# Patient Record
Sex: FEMALE
[De-identification: names, ages, dates, MRNs, and addresses within clinical notes are randomized per-mention and may not be internally consistent; named-entity substitution may affect disease eponyms.]

---

## 2024-10-07 ENCOUNTER — DASHBOARD ENCOUNTERS (OUTPATIENT)
Age: 34
End: 2024-10-07

## 2024-10-07 ENCOUNTER — OFFICE VISIT (OUTPATIENT)
Dept: URBAN - METROPOLITAN AREA CLINIC 82 | Facility: CLINIC | Age: 34
End: 2024-10-07
Payer: COMMERCIAL

## 2024-10-07 ENCOUNTER — LAB OUTSIDE AN ENCOUNTER (OUTPATIENT)
Dept: URBAN - METROPOLITAN AREA CLINIC 82 | Facility: CLINIC | Age: 34
End: 2024-10-07

## 2024-10-07 VITALS
DIASTOLIC BLOOD PRESSURE: 75 MMHG | HEART RATE: 69 BPM | WEIGHT: 164 LBS | BODY MASS INDEX: 32.2 KG/M2 | HEIGHT: 60 IN | SYSTOLIC BLOOD PRESSURE: 110 MMHG | TEMPERATURE: 98.3 F

## 2024-10-07 DIAGNOSIS — R10.13 EPIGASTRIC PAIN: ICD-10-CM

## 2024-10-07 DIAGNOSIS — K82.4 GALLBLADDER POLYP: ICD-10-CM

## 2024-10-07 DIAGNOSIS — R93.89 ABNORMAL CT SCAN: ICD-10-CM

## 2024-10-07 PROBLEM — 129679001: Status: ACTIVE | Noted: 2024-10-07

## 2024-10-07 PROBLEM — 79922009: Status: ACTIVE | Noted: 2024-10-07

## 2024-10-07 PROBLEM — 197433003: Status: ACTIVE | Noted: 2024-10-07

## 2024-10-07 PROCEDURE — 99203 OFFICE O/P NEW LOW 30 MIN: CPT | Performed by: STUDENT IN AN ORGANIZED HEALTH CARE EDUCATION/TRAINING PROGRAM

## 2024-10-07 NOTE — PHYSICAL EXAM MUSCULOSKELETAL:
ROMs unremarkable in UEs/LEs,  , normal gait and station
Normal vision: sees adequately in most situations; can see medication labels, newsprint

## 2024-10-07 NOTE — HPI-TODAY'S VISIT:
33 y/o female presents today for c/o abnormal imagings. Patient was dealing w/ epigastric pain, nausea and dizzy x 2 wks. Patient reports abd pain w/ meals but can be presents w/out meals. Radiates downwards to the lower abd region. Does like to consume acidic meals but recently avoid it. Minimal relief w prilosec, better relief w/ dicyclomine ETOh socially. No smoking. No FHx of CRC cancer, gastric cancer, celiac disease.  CT scan ordered by PCP on 9/25/24 noted non specific diffuse wall thickening of GB. H pylori neg w PCP. Was sent to ER. Went to ER 4 days later, had a RUQ US that noted no gallstones or wall thickening. There is a 6mm gallbladder polyp. CBC/CMP wnl.

## 2024-10-07 NOTE — PHYSICAL EXAM CONSTITUTIONAL:
NAD, alert and oriented, well developed, well nourished, ambulating without difficulty, sitting comfortably in the chair,  by side

## 2024-10-18 ENCOUNTER — WEB ENCOUNTER (OUTPATIENT)
Dept: URBAN - METROPOLITAN AREA CLINIC 82 | Facility: CLINIC | Age: 34
End: 2024-10-18

## 2025-01-27 ENCOUNTER — OFFICE VISIT (OUTPATIENT)
Dept: URBAN - METROPOLITAN AREA CLINIC 82 | Facility: CLINIC | Age: 35
End: 2025-01-27
Payer: COMMERCIAL

## 2025-01-27 VITALS
WEIGHT: 162 LBS | HEIGHT: 60 IN | DIASTOLIC BLOOD PRESSURE: 82 MMHG | BODY MASS INDEX: 31.8 KG/M2 | TEMPERATURE: 98 F | HEART RATE: 71 BPM | SYSTOLIC BLOOD PRESSURE: 121 MMHG

## 2025-01-27 DIAGNOSIS — R79.89 ABNORMAL LFTS: ICD-10-CM

## 2025-01-27 PROCEDURE — 99214 OFFICE O/P EST MOD 30 MIN: CPT | Performed by: INTERNAL MEDICINE

## 2025-01-27 RX ORDER — RIZATRIPTAN BENZOATE 5 MG/1
1 TABLET TABLET ORAL ONCE A DAY
Status: ACTIVE | COMMUNITY
Start: 2025-01-27

## 2025-01-27 NOTE — HPI-TODAY'S VISIT:
34-year-old female patient was seen today for abnormal liver enzymes.  Patient underwent recent lap cholecystectomy for colon "cholelithiasis.  She was noted to have AST and ALT slightly elevated 31 439.  Patient admits to using ibuprofen off and on for possible surgery and presurgery 2.  She also has migraine headaches and is on rizatriptan.  Patient was noted to have a 5 Score of 0.76.  She denies diabetes not on any oral contraceptives denies any family history of liver disease.

## 2025-01-27 NOTE — HPI-TODAY'S VISIT:
33 y/o female presents today for c/o abnormal imagings. Patient was dealing w/ epigastric pain, nausea and dizzy x 2 wks. Patient reports abd pain w/ meals but can be presents w/out meals. Radiates downwards to the lower abd region. Does like to consume acidic meals but recently avoid it. Minimal relief w prilosec, better relief w/ dicyclomine ETOh socially. No smoking. No FHx of CRC cancer, gastric cancer, celiac disease.  CT scan ordered by PCP on 9/25/24 noted non specific diffuse wall thickening of GB. H pylori neg w PCP. Was sent to ER. Went to ER 4 days later, had a RUQ US that noted no gallstones or wall thickening. There is a 6mm gallbladder polyp. CBC/CMP wnl.  1/27/25:

## 2025-01-30 ENCOUNTER — TELEPHONE ENCOUNTER (OUTPATIENT)
Dept: URBAN - METROPOLITAN AREA CLINIC 82 | Facility: CLINIC | Age: 35
End: 2025-01-30

## 2025-01-30 LAB
A/G RATIO: 0.9
ALBUMIN: 3.8
ALKALINE PHOSPHATASE: 56
ALT (SGPT): 34
ALT: 34
ANA PATTERN: (no result)
ANA SCREEN, IFA: POSITIVE
ANA TITER: (no result)
AST (SGOT): 37
AST: 37
BILIRUBIN, TOTAL: 0.4
BUN/CREATININE RATIO: (no result)
BUN: 12
CALCIUM: 8.9
CARBON DIOXIDE, TOTAL: 24
CHLORIDE: 109
CREATININE: 0.6
EGFR: 122
FIB 4 INDEX: 0.95
GLOBULIN, TOTAL: 4.1
GLUCOSE: 96
HEP A AB, IGM: (no result)
HEPATITIS A AB, TOTAL: REACTIVE
HEPATITIS B SURFACE ANTIBODY QL: REACTIVE
HEPATITIS C ANTIBODY: (no result)
PLATELET COUNT: 213
POTASSIUM: 4
PROTEIN, TOTAL: 7.9
RHEUMATOID FACTOR: 11
SJOGREN'S ANTIBODY (SS-A): (no result)
SJOGREN'S ANTIBODY (SS-B): (no result)
SODIUM: 140

## 2025-02-10 ENCOUNTER — TELEPHONE ENCOUNTER (OUTPATIENT)
Dept: URBAN - METROPOLITAN AREA CLINIC 82 | Facility: CLINIC | Age: 35
End: 2025-02-10

## 2025-02-10 ENCOUNTER — LAB OUTSIDE AN ENCOUNTER (OUTPATIENT)
Dept: URBAN - METROPOLITAN AREA CLINIC 82 | Facility: CLINIC | Age: 35
End: 2025-02-10

## 2025-02-10 PROBLEM — 128241005: Status: ACTIVE | Noted: 2025-02-10

## 2025-03-27 ENCOUNTER — OFFICE VISIT (OUTPATIENT)
Dept: URBAN - METROPOLITAN AREA CLINIC 82 | Facility: CLINIC | Age: 35
End: 2025-03-27

## 2025-03-27 RX ORDER — RIZATRIPTAN BENZOATE 5 MG/1
1 TABLET TABLET ORAL ONCE A DAY
Status: ACTIVE | COMMUNITY
Start: 2025-01-27

## 2025-07-28 ENCOUNTER — OFFICE VISIT (OUTPATIENT)
Dept: URBAN - METROPOLITAN AREA CLINIC 82 | Facility: CLINIC | Age: 35
End: 2025-07-28